# Patient Record
Sex: FEMALE | Race: OTHER | HISPANIC OR LATINO
[De-identification: names, ages, dates, MRNs, and addresses within clinical notes are randomized per-mention and may not be internally consistent; named-entity substitution may affect disease eponyms.]

---

## 2017-08-23 PROBLEM — Z00.00 ENCOUNTER FOR PREVENTIVE HEALTH EXAMINATION: Status: ACTIVE | Noted: 2017-08-23

## 2017-09-08 ENCOUNTER — APPOINTMENT (OUTPATIENT)
Dept: PULMONOLOGY | Facility: CLINIC | Age: 57
End: 2017-09-08
Payer: MEDICAID

## 2017-09-08 VITALS
HEIGHT: 62 IN | DIASTOLIC BLOOD PRESSURE: 70 MMHG | TEMPERATURE: 97.7 F | BODY MASS INDEX: 28.52 KG/M2 | OXYGEN SATURATION: 95 % | WEIGHT: 155 LBS | HEART RATE: 80 BPM | SYSTOLIC BLOOD PRESSURE: 106 MMHG

## 2017-09-08 DIAGNOSIS — Z87.891 PERSONAL HISTORY OF NICOTINE DEPENDENCE: ICD-10-CM

## 2017-09-08 PROCEDURE — 99202 OFFICE O/P NEW SF 15 MIN: CPT | Mod: 25

## 2017-09-08 PROCEDURE — 94010 BREATHING CAPACITY TEST: CPT

## 2017-09-08 PROCEDURE — 71020: CPT

## 2017-10-10 ENCOUNTER — APPOINTMENT (OUTPATIENT)
Dept: PULMONOLOGY | Facility: CLINIC | Age: 57
End: 2017-10-10

## 2017-11-06 ENCOUNTER — FORM ENCOUNTER (OUTPATIENT)
Age: 57
End: 2017-11-06

## 2017-11-07 ENCOUNTER — OUTPATIENT (OUTPATIENT)
Dept: OUTPATIENT SERVICES | Facility: HOSPITAL | Age: 57
LOS: 1 days | End: 2017-11-07
Payer: MEDICAID

## 2017-11-07 ENCOUNTER — APPOINTMENT (OUTPATIENT)
Dept: PULMONOLOGY | Facility: CLINIC | Age: 57
End: 2017-11-07
Payer: MEDICAID

## 2017-11-07 VITALS
WEIGHT: 155 LBS | HEIGHT: 62 IN | BODY MASS INDEX: 28.52 KG/M2 | SYSTOLIC BLOOD PRESSURE: 110 MMHG | OXYGEN SATURATION: 96 % | HEART RATE: 82 BPM | DIASTOLIC BLOOD PRESSURE: 68 MMHG

## 2017-11-07 DIAGNOSIS — Z87.891 PERSONAL HISTORY OF NICOTINE DEPENDENCE: ICD-10-CM

## 2017-11-07 DIAGNOSIS — F17.210 NICOTINE DEPENDENCE, CIGARETTES, UNCOMPLICATED: ICD-10-CM

## 2017-11-07 PROCEDURE — G0297: CPT | Mod: 26

## 2017-11-07 PROCEDURE — G0296 VISIT TO DETERM LDCT ELIG: CPT

## 2017-11-07 PROCEDURE — 99406 BEHAV CHNG SMOKING 3-10 MIN: CPT

## 2017-11-07 PROCEDURE — G0297: CPT

## 2017-11-22 ENCOUNTER — APPOINTMENT (OUTPATIENT)
Dept: PULMONOLOGY | Facility: CLINIC | Age: 57
End: 2017-11-22
Payer: COMMERCIAL

## 2017-11-22 PROCEDURE — 99213 OFFICE O/P EST LOW 20 MIN: CPT

## 2017-11-22 RX ORDER — VARENICLINE TARTRATE 1 MG/1
1 TABLET, FILM COATED ORAL TWICE DAILY
Qty: 1 | Refills: 5 | Status: ACTIVE | COMMUNITY
Start: 2017-11-22 | End: 1900-01-01

## 2018-01-03 ENCOUNTER — RX RENEWAL (OUTPATIENT)
Age: 58
End: 2018-01-03

## 2020-05-30 ENCOUNTER — EMERGENCY (EMERGENCY)
Facility: HOSPITAL | Age: 60
LOS: 1 days | Discharge: HOME | End: 2020-05-30
Attending: EMERGENCY MEDICINE | Admitting: EMERGENCY MEDICINE
Payer: COMMERCIAL

## 2020-05-30 VITALS
WEIGHT: 139.99 LBS | HEART RATE: 110 BPM | OXYGEN SATURATION: 94 % | TEMPERATURE: 98 F | DIASTOLIC BLOOD PRESSURE: 78 MMHG | HEIGHT: 62 IN | RESPIRATION RATE: 18 BRPM | SYSTOLIC BLOOD PRESSURE: 114 MMHG

## 2020-05-30 VITALS
RESPIRATION RATE: 18 BRPM | SYSTOLIC BLOOD PRESSURE: 111 MMHG | TEMPERATURE: 98 F | HEART RATE: 87 BPM | OXYGEN SATURATION: 94 % | DIASTOLIC BLOOD PRESSURE: 67 MMHG

## 2020-05-30 LAB
ALBUMIN SERPL ELPH-MCNC: 3.9 G/DL — SIGNIFICANT CHANGE UP (ref 3.3–5)
ALP SERPL-CCNC: 215 U/L — HIGH (ref 40–120)
ALT FLD-CCNC: 997 U/L — HIGH (ref 10–45)
ANION GAP SERPL CALC-SCNC: 14 MMOL/L — SIGNIFICANT CHANGE UP (ref 5–17)
APPEARANCE UR: CLEAR — SIGNIFICANT CHANGE UP
APTT BLD: 29.1 SEC — SIGNIFICANT CHANGE UP (ref 27.5–36.3)
AST SERPL-CCNC: 501 U/L — HIGH (ref 10–40)
BASOPHILS # BLD AUTO: 0.1 K/UL — SIGNIFICANT CHANGE UP (ref 0–0.2)
BASOPHILS NFR BLD AUTO: 1.5 % — SIGNIFICANT CHANGE UP (ref 0–2)
BILIRUB SERPL-MCNC: 1.6 MG/DL — HIGH (ref 0.2–1.2)
BILIRUB UR-MCNC: ABNORMAL
BUN SERPL-MCNC: 11 MG/DL — SIGNIFICANT CHANGE UP (ref 7–23)
CALCIUM SERPL-MCNC: 9.6 MG/DL — SIGNIFICANT CHANGE UP (ref 8.4–10.5)
CHLORIDE SERPL-SCNC: 104 MMOL/L — SIGNIFICANT CHANGE UP (ref 96–108)
CO2 SERPL-SCNC: 23 MMOL/L — SIGNIFICANT CHANGE UP (ref 22–31)
COLOR SPEC: YELLOW — SIGNIFICANT CHANGE UP
CREAT SERPL-MCNC: 0.67 MG/DL — SIGNIFICANT CHANGE UP (ref 0.5–1.3)
DIFF PNL FLD: NEGATIVE — SIGNIFICANT CHANGE UP
EOSINOPHIL # BLD AUTO: 0.3 K/UL — SIGNIFICANT CHANGE UP (ref 0–0.5)
EOSINOPHIL NFR BLD AUTO: 4.4 % — SIGNIFICANT CHANGE UP (ref 0–6)
GLUCOSE SERPL-MCNC: 97 MG/DL — SIGNIFICANT CHANGE UP (ref 70–99)
GLUCOSE UR QL: NEGATIVE — SIGNIFICANT CHANGE UP
HAV IGM SER-ACNC: SIGNIFICANT CHANGE UP
HBV CORE IGM SER-ACNC: SIGNIFICANT CHANGE UP
HBV SURFACE AG SER-ACNC: SIGNIFICANT CHANGE UP
HCT VFR BLD CALC: 36.3 % — SIGNIFICANT CHANGE UP (ref 34.5–45)
HCV AB S/CO SERPL IA: 0.1 S/CO — SIGNIFICANT CHANGE UP
HCV AB SERPL-IMP: SIGNIFICANT CHANGE UP
HGB BLD-MCNC: 11.4 G/DL — LOW (ref 11.5–15.5)
IMM GRANULOCYTES NFR BLD AUTO: 0.1 % — SIGNIFICANT CHANGE UP (ref 0–1.5)
INR BLD: 1.05 — SIGNIFICANT CHANGE UP (ref 0.88–1.16)
KETONES UR-MCNC: ABNORMAL MG/DL
LEUKOCYTE ESTERASE UR-ACNC: NEGATIVE — SIGNIFICANT CHANGE UP
LYMPHOCYTES # BLD AUTO: 1.76 K/UL — SIGNIFICANT CHANGE UP (ref 1–3.3)
LYMPHOCYTES # BLD AUTO: 25.5 % — SIGNIFICANT CHANGE UP (ref 13–44)
MCHC RBC-ENTMCNC: 26.5 PG — LOW (ref 27–34)
MCHC RBC-ENTMCNC: 31.4 GM/DL — LOW (ref 32–36)
MCV RBC AUTO: 84.4 FL — SIGNIFICANT CHANGE UP (ref 80–100)
MONOCYTES # BLD AUTO: 0.6 K/UL — SIGNIFICANT CHANGE UP (ref 0–0.9)
MONOCYTES NFR BLD AUTO: 8.7 % — SIGNIFICANT CHANGE UP (ref 2–14)
NEUTROPHILS # BLD AUTO: 4.12 K/UL — SIGNIFICANT CHANGE UP (ref 1.8–7.4)
NEUTROPHILS NFR BLD AUTO: 59.8 % — SIGNIFICANT CHANGE UP (ref 43–77)
NITRITE UR-MCNC: NEGATIVE — SIGNIFICANT CHANGE UP
NRBC # BLD: 0 /100 WBCS — SIGNIFICANT CHANGE UP (ref 0–0)
PH UR: 5.5 — SIGNIFICANT CHANGE UP (ref 5–8)
PLATELET # BLD AUTO: 263 K/UL — SIGNIFICANT CHANGE UP (ref 150–400)
POTASSIUM SERPL-MCNC: 4.1 MMOL/L — SIGNIFICANT CHANGE UP (ref 3.5–5.3)
POTASSIUM SERPL-SCNC: 4.1 MMOL/L — SIGNIFICANT CHANGE UP (ref 3.5–5.3)
PROT SERPL-MCNC: 7.1 G/DL — SIGNIFICANT CHANGE UP (ref 6–8.3)
PROT UR-MCNC: ABNORMAL MG/DL
PROTHROM AB SERPL-ACNC: 12 SEC — SIGNIFICANT CHANGE UP (ref 10–12.9)
RBC # BLD: 4.3 M/UL — SIGNIFICANT CHANGE UP (ref 3.8–5.2)
RBC # FLD: 15.4 % — HIGH (ref 10.3–14.5)
SARS-COV-2 RNA SPEC QL NAA+PROBE: SIGNIFICANT CHANGE UP
SODIUM SERPL-SCNC: 141 MMOL/L — SIGNIFICANT CHANGE UP (ref 135–145)
SP GR SPEC: >=1.03 — SIGNIFICANT CHANGE UP (ref 1–1.03)
UROBILINOGEN FLD QL: 1 E.U./DL — SIGNIFICANT CHANGE UP
WBC # BLD: 6.89 K/UL — SIGNIFICANT CHANGE UP (ref 3.8–10.5)
WBC # FLD AUTO: 6.89 K/UL — SIGNIFICANT CHANGE UP (ref 3.8–10.5)

## 2020-05-30 PROCEDURE — 87635 SARS-COV-2 COVID-19 AMP PRB: CPT

## 2020-05-30 PROCEDURE — 76705 ECHO EXAM OF ABDOMEN: CPT | Mod: 26

## 2020-05-30 PROCEDURE — 99284 EMERGENCY DEPT VISIT MOD MDM: CPT

## 2020-05-30 PROCEDURE — 80053 COMPREHEN METABOLIC PANEL: CPT

## 2020-05-30 PROCEDURE — 85610 PROTHROMBIN TIME: CPT

## 2020-05-30 PROCEDURE — 81001 URINALYSIS AUTO W/SCOPE: CPT

## 2020-05-30 PROCEDURE — 74177 CT ABD & PELVIS W/CONTRAST: CPT

## 2020-05-30 PROCEDURE — 83690 ASSAY OF LIPASE: CPT

## 2020-05-30 PROCEDURE — 87086 URINE CULTURE/COLONY COUNT: CPT

## 2020-05-30 PROCEDURE — 99285 EMERGENCY DEPT VISIT HI MDM: CPT

## 2020-05-30 PROCEDURE — 36415 COLL VENOUS BLD VENIPUNCTURE: CPT

## 2020-05-30 PROCEDURE — 80074 ACUTE HEPATITIS PANEL: CPT

## 2020-05-30 PROCEDURE — 85730 THROMBOPLASTIN TIME PARTIAL: CPT

## 2020-05-30 PROCEDURE — 82962 GLUCOSE BLOOD TEST: CPT

## 2020-05-30 PROCEDURE — 85025 COMPLETE CBC W/AUTO DIFF WBC: CPT

## 2020-05-30 PROCEDURE — 74177 CT ABD & PELVIS W/CONTRAST: CPT | Mod: 26

## 2020-05-30 PROCEDURE — 76705 ECHO EXAM OF ABDOMEN: CPT

## 2020-05-30 RX ORDER — SODIUM CHLORIDE 9 MG/ML
1000 INJECTION INTRAMUSCULAR; INTRAVENOUS; SUBCUTANEOUS ONCE
Refills: 0 | Status: COMPLETED | OUTPATIENT
Start: 2020-05-30 | End: 2020-05-30

## 2020-05-30 RX ORDER — IOHEXOL 300 MG/ML
30 INJECTION, SOLUTION INTRAVENOUS ONCE
Refills: 0 | Status: COMPLETED | OUTPATIENT
Start: 2020-05-30 | End: 2020-05-30

## 2020-05-30 RX ADMIN — Medication 50 MILLIGRAM(S): at 14:02

## 2020-05-30 RX ADMIN — SODIUM CHLORIDE 1000 MILLILITER(S): 9 INJECTION INTRAMUSCULAR; INTRAVENOUS; SUBCUTANEOUS at 15:00

## 2020-05-30 RX ADMIN — IOHEXOL 30 MILLILITER(S): 300 INJECTION, SOLUTION INTRAVENOUS at 15:00

## 2020-05-30 NOTE — ED ADULT TRIAGE NOTE - CHIEF COMPLAINT QUOTE
"I have UTI. I was on nitrofurantoin, but then I had allergic reaction on the 4th day. Then, I took amoxicillin for 7 days. My urine is still dark. I'm also still itchy."

## 2020-05-30 NOTE — ED PROVIDER NOTE - OBJECTIVE STATEMENT
58 y/o M with PMHx of reflux on omeprazole with dark urine x 10 days and urinary incontinence for the past few months, has had to wear an adult diaper due to incontinence but urine became dark recently which patient is concerned about. Went to  and had a UTI showing on UA, given macrobid, taken for 4 days after which she developed itchyness and hives, scratching but no throat swelling. Returned to  and taken off Macrobid, given Amoxicillin instead as well as Benadryl. patient still feels itchy, especially at night, and says benadryl makes her feel tried and her urine is still dark after taking the Macrobid. Saw outside urologist who took another sample but was focused on addressing the incontinence, recommending possible surgery, patient however dissatisfied because she is concerned about the color of her urine which he did not mention. Denies abdominal pain, back pain, fever, chills, nausea, vomiting, diarrhea, SOB, chest pain.

## 2020-05-30 NOTE — ED PROVIDER NOTE - PATIENT PORTAL LINK FT
You can access the FollowMyHealth Patient Portal offered by [facility] by registering at the following website: [email or web address]. By joining [facility]’s FollowMyHealth portal, you will also be able to view your health information using other applications (apps) compatible with our system.

## 2020-05-30 NOTE — ED ADULT NURSE NOTE - OBJECTIVE STATEMENT
Pt is a 58 y/o female came in to ED for evaluation of dark urine and itching at night that started when she was taking nitrofurantoin  for UTI. PT reports finishing a course of Augmenting, however continues to feel itching at night. Pt denies urinary discomfort, denies back pain, denies any other symptoms.

## 2020-05-30 NOTE — ED PROVIDER NOTE - NSFOLLOWUPINSTRUCTIONS_ED_ALL_ED_FT
Your liver enzymes were elevated causing bilirubin in urine (dark colored) which is also causing your itching.  Follow up with your GI doctor on Monday as scheduled and show him labs and imaging.  Have your labs repeated in 1-2 weeks to ensure normalization of enzymes.    What is transaminitis?  Your liver breaks down nutrients and filters toxins out of your body, which it does with the help of enzymes. Transaminitis, sometimes called hypertransaminasemia, refers to having high levels of certain liver enzymes called transaminases. When you have too many enzymes in your liver, they start to move into your blood stream. Alanine transaminase (ALT) and aspartate transaminase (AST) are the two most common transaminases involved in transaminitis.    Most people with transaminitis don’t know they have it until they do a liver function test. Transaminitis itself doesn’t produce any symptoms, but it usually indicates that there’s something else going on, so doctors use it as a diagnostic tool. Some people also have temporarily high levels of liver enzymes without any underlying cause. However, because transaminitis can by a symptom of serious conditions, such as liver disease or hepatitis, it’s important to rule out any potential causes.    Common causes of transaminitis  Fatty liver disease  Your liver naturally contains some fat, but too much of it can lead to fatty liver disease. It’s usually associated with drinking large amounts of alcohol, but nonalcoholic fatty liver disease is becoming more common. No one’s sure exactly what causes nonalcoholic fatty liver disease, but common risk factors include:    obesity  high cholesterol  Fatty liver disease usually doesn’t cause any symptoms, and most people don’t know they have it until they get a blood test. However, some people have fatigue, mild abdominal pain, or an enlarged liver that your doctor can feel during a physical exam. Treating fatty liver disease often involves lifestyle changes, such as avoiding alcohol, maintaining a healthy weight, and eating a balanced diet.    Viral hepatitis  Hepatitis refers to inflammation of the liver. There are several types of hepatitis, but the most common one is viral hepatitis. The most common types of viral hepatitis that cause transaminitis are hepatitis B and hepatitis C.    Hepatitis B and C share the same symptoms, which include:    yellow-tinted skin and eyes, called jaundice  dark urine  nausea and vomiting  fatigue  abdominal pain or discomfort  joint and muscle pain  fever  loss of appetite  Talk to your doctor if you have any symptoms of viral hepatitis. If left untreated, it can cause permanent liver damage, especially if you have hepatitis C.    Medications, supplements, and herbs  In addition to helping your body process food, your liver also breaks down anything else you take by mouth, including medications, supplements, and herbs. Sometimes these can cause transaminitis, especially when they’re taken in high doses.    Medications that can cause transaminitis include:    over-the-counter pain medications, such as acetaminophen (Tylenol) or ibuprofen (Advil, Motrin)  statins, such as atorvastatin (Lipitor) and lovastatin (Mevacor, Altocor)  cardiovascular medications, such as amiodarone (Cordarone) and hydralazine (Apresoline)  cyclic antidepressants, such as desipramine (Norpramin) and Imipramine (Tofranil)  Supplements that may cause transaminitis include:    vitamin A  Common herbs that may cause transaminitis include:    chaparral  kava  senna  skullcap  ephedra  If you take any of these, tell your doctor about any unusual symptoms you have. You may also want to have your blood tested regularly to make sure they’re not affecting your liver. If they are, you likely just need to lower the amount you take.      Less common causes of transaminitis  HELLP syndrome  HELLP syndrome is a serious condition that affects 5–8 percent of pregnancies. It refers to a group of symptoms that include:    Hemolysis  EL: elevated liver enzymes  LP: low platelet count  It’s often associated with preeclampsia, which causes high blood pressure in pregnant women. HELLP syndrome can cause liver damage, bleeding problems, and even death if it’s not properly managed.    Additional symptoms of HELLP syndrome include:    fatigue  stomach pain  nausea and vomiting  abdominal pain  shoulder pain  pain when breathing deeply  bleeding  swelling  changes in vision  If you’re pregnant and start to notice any of these symptoms, contact your doctor as soon as possible.    Genetic diseases  Several inherited diseases can cause transaminitis. They’re usually conditions that affect your body’s metabolic processes.    Genetic diseases that can cause transaminitis include:    hemochromatosis  celiac disease  Geoff’s disease  alpha-antitrypsin deficiency  Nonviral hepatitis  Autoimmune hepatitis and alcoholic hepatitis are two common types of nonviral hepatitis that can cause transaminitis. Nonviral hepatitis produces the same symptoms as viral hepatitis.    Autoimmune hepatitis happens when your immune system attacks cells in your liver. Researchers aren’t sure what causes this, but genetic and environmental factors seem to play a role.    Alcoholic hepatitis results from drinking a lot of alcohol, usually over the course of many years. If you have alcoholic hepatitis, you must stop drinking alcohol. Not doing so can lead to serious complications, including death.    Viral infections  The most common viral infections that cause transaminitis are infectious mononucleosis and cytomegalovirus (CMV) infection.    Infectious mononucleosis is spread through saliva and may cause:    swollen tonsils and lymph nodes  sore throat  fever  swollen spleen  headaches  fever  CMV infection is very common and can be spread through several body fluids, including saliva, blood, urine, semen, and breast milk. Most people don’t experience any symptoms unless they have a weakened immune system. When CMV infection does cause symptoms, they’re usually similar to those of infectious mononucleosis.    The bottom line  A variety of things, from serious diseases to simple medication changes, can cause elevated liver enzymes, known as transaminitis. It’s also not unusual for some people to temporarily have increased liver enzymes. If a blood test shows that you have transaminitis, it’s important to work with your doctor to rule out any possible underlying causes because many of them can lead to serious liver damage and even liver failure if left untreated.

## 2020-05-30 NOTE — ED ADULT TRIAGE NOTE - OTHER COMPLAINTS
Pt denies burning/frequent urination, abdominal pain, vomiting, fever. Pt denies burning/frequent urination, abdominal pain, vomiting, fever, palpitation, SOB, chest pain, cough.

## 2020-05-30 NOTE — ED PROVIDER NOTE - CLINICAL SUMMARY MEDICAL DECISION MAKING FREE TEXT BOX
60 y/o F with urinary incontinence for the past few months alongside darkening urine 10 days ago, taken entire course of amoxicillin and partial course of macrobid (stopped due to allergic rx) with no improvement, also notes still itchy although 5 days off macrobid. Noted random areas of urticaria on back and chest. Plan for labs including UA and give 50mg prednisone to relieve rashes. 60 y/o F with urinary incontinence for the past few months alongside darkening urine 10 days ago, taken entire course of amoxicillin and partial course of macrobid (stopped due to allergic rx) with no improvement, also notes still itchy although 5 days off macrobid. Noted random areas of urticaria on back and chest. Plan for labs including UA and give 50mg prednisone to relieve rash.  Pt with bilirubin in urine and elevated LFTs.  hep panel neg, ct abd/pelvis - no findings, will send for US.  ? related to macrobid - mild hepatitis.

## 2020-06-01 LAB
CULTURE RESULTS: SIGNIFICANT CHANGE UP
SPECIMEN SOURCE: SIGNIFICANT CHANGE UP

## 2020-06-02 DIAGNOSIS — R94.5 ABNORMAL RESULTS OF LIVER FUNCTION STUDIES: ICD-10-CM

## 2020-06-02 DIAGNOSIS — R32 UNSPECIFIED URINARY INCONTINENCE: ICD-10-CM

## 2020-06-02 DIAGNOSIS — Z20.828 CONTACT WITH AND (SUSPECTED) EXPOSURE TO OTHER VIRAL COMMUNICABLE DISEASES: ICD-10-CM

## 2020-06-02 DIAGNOSIS — R82.998 OTHER ABNORMAL FINDINGS IN URINE: ICD-10-CM

## 2020-09-18 PROBLEM — K21.9 GASTRO-ESOPHAGEAL REFLUX DISEASE WITHOUT ESOPHAGITIS: Chronic | Status: ACTIVE | Noted: 2020-05-30

## 2020-09-29 ENCOUNTER — APPOINTMENT (OUTPATIENT)
Dept: PULMONOLOGY | Facility: CLINIC | Age: 60
End: 2020-09-29
Payer: MEDICAID

## 2020-09-29 VITALS
WEIGHT: 140 LBS | OXYGEN SATURATION: 95 % | HEIGHT: 62 IN | HEART RATE: 79 BPM | BODY MASS INDEX: 25.76 KG/M2 | TEMPERATURE: 98.1 F | DIASTOLIC BLOOD PRESSURE: 68 MMHG | SYSTOLIC BLOOD PRESSURE: 120 MMHG

## 2020-09-29 PROCEDURE — 99214 OFFICE O/P EST MOD 30 MIN: CPT | Mod: 25

## 2020-09-29 PROCEDURE — 94010 BREATHING CAPACITY TEST: CPT

## 2020-09-29 RX ORDER — VARENICLINE TARTRATE 0.5 (11)-1
0.5 MG X 11 & KIT ORAL
Qty: 1 | Refills: 0 | Status: ACTIVE | COMMUNITY
Start: 2017-11-22 | End: 1900-01-01

## 2020-09-30 NOTE — HISTORY OF PRESENT ILLNESS
[TextBox_4] : Current smoker here for follow up for CT chest done for lung CA screening. Her initial scan was in 2017 and was supposed to get a follow up 1 year after but did not for unclear reasons. \par She has a chronic dry cough that is unchanged, but otherwise has no other respiratory symptoms and feels well overall. No other constitutional symptoms including unintended weight loss, night sweats, fevers.

## 2020-09-30 NOTE — DISCUSSION/SUMMARY
[FreeTextEntry1] : Scan reviewed ([facility]) - No significant change since 2017. \par \par Counselled about smoking cessation and referred to our smoking cessation program. \par \par f/u CT in 1 year.

## 2020-10-02 ENCOUNTER — APPOINTMENT (OUTPATIENT)
Dept: PULMONOLOGY | Facility: CLINIC | Age: 60
End: 2020-10-02
Payer: MEDICAID

## 2020-10-02 DIAGNOSIS — F17.200 NICOTINE DEPENDENCE, UNSPECIFIED, UNCOMPLICATED: ICD-10-CM

## 2020-10-02 PROCEDURE — 99443: CPT

## 2020-10-02 RX ORDER — NICOTINE POLACRILEX 4 MG/1
4 LOZENGE ORAL
Qty: 1 | Refills: 1 | Status: ACTIVE | COMMUNITY
Start: 2020-10-02 | End: 1900-01-01

## 2020-10-02 NOTE — HISTORY OF PRESENT ILLNESS
[Medical Office: ([facility])___] : at the medical office located in  [TextBox_4] : 60 year old current smoker interested in smoking cessation referred by Dr. Cruz. \par \par \par (1) Choose the statement that best describes your situation \par a.             I am not thinking about quitting in the next 6 months.\par b.             I am ambivalent, but thinking about quitting within the next 6 months.\par c.             I am getting ready to quit within the next 30 days and have a quit date.\par d.             I have quit smoking within the past 6 months and am actively applying cessation skills.\par e.             I have quit for more than 6 months & am integrating smoke-free living into my daily routine.\par  \par \par C. \par  \par  \par (2)) Tobacco Use:\par  \par What do you smoke? Cigarettes \par How old were you when you first started smoking? 32 years old\par How long have you been smoking? 28 years\par How many packs per day, on average? 1/2 pack a day \par   \par  \par (3) Heaviness of Smoking Index\par A, How many cigarettes, on average, do you smoke per day? \par 1-10 (0 pts) \par 11-20 (1 pt)\par 21-30 (2 pts)\par 31+ (3 pts)\par B. How soon after waking up do you smoke your first cigarette?\par </=5 mins (3 pts)\par 6-30 mins (2 pts)\par 31-60 mins (1 pt)\par 60+ mins (0 pts)\par Total Score: \par Low (0-1)\par Medium (2-4)\par High (5-6) \par \par A- 0\par B-3\par Score: 3; Medium\par                                  \par (4) Have you ever tried to quit smoking before? No\par - If so, what was the longest period of time that you were able to quit?  \par - How were you successful in quitting?\par  \par (5) Using a scale of 1-10, with 1 being not at all and 10 being extremely confident\par - How confident are you that you will succeed in stopping your tobacco use now? \par 9\par  \par (6) What triggers your tobacco use now? (For example;  major life crisis, stress, weight gain, being at work, feeling anxious, coffee, talking on the phone, alcohol, after meals when driving, around other smokers, alone/bored, in restaurants, in pain, etc.) \par "I enjoy it", "It's my emotional availability" \par                                                                                                                                                                                 \par (7)  What are your obstacles to quitting? (For example; cost of medications, weight gain, social situations, stress, unbearable cravings, fear of failure, spouse smokes, etc.) What are some ways we can overcome these obstacles together?\par Fear of failing\par Can overcome this by taking "baby steps"                                                                                                                                                                                                                                                           \par (8) Why do you want to quit now? (For example, pressure from doctor, cost of cigarettes, fewer places were smoking is allowed, concern of secondhand smoke exposure, pressure from family, change in health status, etc.) \par Live in a non-smoking building, fear of getting kicked out- feels like she can enjoy her apartment and not live in fear of getting kicked out \par CT screening for lung cancer was clear, but has this smoker's cough that really bothers her, especially now during the COVID \par                                                                                                                                                                                                                                                                                                                                                                    \par (9) Quit Date: 10/30/20\par \par  \par (10) Means of quitting:\par  \par Acknowledged how difficult it is to quit smoking. Advised that smoking is the most important thing a person can do for their health. Discussed strategies to deal with cravings and suggested keeping a log of cigarettes smoked and their triggers in an effort to identify triggers and break routines/habits. Suggested writing down each time of cigarette and attempting to delay time to next cigarette. We had a thorough discussion of the side effects of nicotine replacement therapy, including anxiety and insomnia. We have had an in-depth discussion regarding specific coping mechanisms, such as deep breathing, keeping hands busy (e.g. doodling, stress ball, knitting), changing activities associated with smoking (such as walking/reading a book instead of taking a cigarette break), not carrying cigarettes or lighters around, visiting places more often which don't allow smoking (such as museums & libraries), eating low-calorie and healthy foods when experiencing urges/cravings (such as carrot/celery sticks, fresh fruit), drinking a lot of fluid, cutting down on caffeine & alcohol (can trigger smoking urge), exercising, & spending time with non-smokers.\par \par \par We have had an in-depth discussion on Chantix, including the various side effects such as nausea, headache, insomnia, dry mouth & vivid dreams. I advised patient to take medication with a full glass of water and food to avoid nausea. Patient advised to contact office immediately should he/she experience any changes in mood or behavior. Patient to avoid driving or doing other similar tasks requiring alertness until patient determines how drug affects him/her, as Chantix may cause CNS depression. Confirmed with patient no hx of CKD or ESRD. \par \par We also discussed NRT, specifically patch and lozenge including dosing, side effects & proper usage. \par \par Does not want referral to NRT at this time. Given "smokefree.gov" as a resource. \par  [Home] : at home, [unfilled] , at the time of the visit. [Other Location: e.g. Home (Enter Location, City,State)___] : at [unfilled] [Verbal consent obtained from patient] : the patient, [unfilled]

## 2020-10-19 ENCOUNTER — NON-APPOINTMENT (OUTPATIENT)
Age: 60
End: 2020-10-19

## 2020-12-07 ENCOUNTER — NON-APPOINTMENT (OUTPATIENT)
Age: 60
End: 2020-12-07

## 2021-10-25 ENCOUNTER — NON-APPOINTMENT (OUTPATIENT)
Age: 61
End: 2021-10-25

## 2021-11-01 ENCOUNTER — APPOINTMENT (OUTPATIENT)
Dept: PULMONOLOGY | Facility: CLINIC | Age: 61
End: 2021-11-01
Payer: MEDICAID

## 2021-11-01 VITALS
SYSTOLIC BLOOD PRESSURE: 130 MMHG | OXYGEN SATURATION: 96 % | HEART RATE: 82 BPM | WEIGHT: 166 LBS | HEIGHT: 62 IN | TEMPERATURE: 97.6 F | DIASTOLIC BLOOD PRESSURE: 78 MMHG | BODY MASS INDEX: 30.55 KG/M2

## 2021-11-01 PROCEDURE — 99214 OFFICE O/P EST MOD 30 MIN: CPT

## 2021-11-02 NOTE — DISCUSSION/SUMMARY
[FreeTextEntry1] : Current smoker (on chantix and nicotine patch- stopped today).\par Enrolled in lung cancer screening trial and was told by PCP that she has left lung collapse. We reviewed CT imaging with her and found to have linear atelectasis in left lower lobe which has been stable for many years now. Resume yearly CT scans. She denies any dyspnea.\par \par Counselled about smoking cessation \par \par f/u CT in 1 year.

## 2021-11-02 NOTE — HISTORY OF PRESENT ILLNESS
[TextBox_4] : Current smoker here for follow up for CT chest done for lung CA screening. Her initial scan was in 2017 and was supposed to get a follow up 1 year after but did not for unclear reasons. \par She has a chronic dry cough that is unchanged, but otherwise has no other respiratory symptoms and feels well overall. No other constitutional symptoms including unintended weight loss, night sweats, fevers. \par \par she had been coughing, dry cough usually in morning x 6 months. Cough has now improved. She had a routine lung cancer screening CT chest in 10/25/21 showing left lung collapse ? and she is worried about this. She is smoking 40 years (20 pack years).  Currently using chantix and nicotine patch x 3 days and hasn't smoked today.

## 2022-03-25 ENCOUNTER — APPOINTMENT (OUTPATIENT)
Dept: NEUROLOGY | Facility: CLINIC | Age: 62
End: 2022-03-25
Payer: MEDICAID

## 2022-03-25 VITALS
OXYGEN SATURATION: 96 % | WEIGHT: 163 LBS | BODY MASS INDEX: 30 KG/M2 | DIASTOLIC BLOOD PRESSURE: 74 MMHG | HEART RATE: 92 BPM | SYSTOLIC BLOOD PRESSURE: 107 MMHG | HEIGHT: 62 IN | TEMPERATURE: 97.8 F

## 2022-03-25 DIAGNOSIS — Z87.898 PERSONAL HISTORY OF OTHER SPECIFIED CONDITIONS: ICD-10-CM

## 2022-03-25 DIAGNOSIS — F32.A DEPRESSION, UNSPECIFIED: ICD-10-CM

## 2022-03-25 DIAGNOSIS — Z82.0 FAMILY HISTORY OF EPILEPSY AND OTHER DISEASES OF THE NERVOUS SYSTEM: ICD-10-CM

## 2022-03-25 DIAGNOSIS — R47.89 OTHER SPEECH DISTURBANCES: ICD-10-CM

## 2022-03-25 DIAGNOSIS — R51.9 HEADACHE, UNSPECIFIED: ICD-10-CM

## 2022-03-25 PROCEDURE — 99203 OFFICE O/P NEW LOW 30 MIN: CPT

## 2022-03-25 NOTE — PHYSICAL EXAM
[FreeTextEntry1] : Physical Exam\par Constitutional: no apparent distress\par Psychiatric: normal affect, euthymic, alert and oriented x 3\par \par Neurologic Exam:\par Mental Status: awake and alert, speech fluent and prosodic with no paraphasic errors, MOCA 21/30\par Cranial Nerves: I: deferred; II: pupils equal round and reactive, visual fields full to confrontation, III, IV, VI: extraocular movements full with no nystagmus; V: facial sensation intact and symmetric; VII: facial power symmetric; VIII: hearing intact to finger rub; IX/X: palate elevates symmetrically, no dysarthria; XI: shoulder shrug symmetric; XII: tongue protrudes midline\par Motor: normal bulk and tone, no orbiting or pronator drift, power 5/5 to confrontation throughout including shoulder abduction, elbow flexion and extension, wrist flexion and extension, hip flexion, knee flexion and extension, no abnormal movements\par Sensation: intact to light touch in distal upper and lower extremities bilaterally\par Coordination: finger-nose-finger intact bilaterally\par Reflexes: 2+ biceps, triceps, brachioradialis, patella\par Gait: narrow base, normal stance and stride, normal arm swing, normal tandem, negative Romberg\par

## 2022-03-25 NOTE — HISTORY OF PRESENT ILLNESS
[FreeTextEntry1] : About two months ago had a week-long episode during which she had trouble remembering names (celebrities and people she knows).  Has also been having intermittent headaches since entering perimenopause ~9 months ago, bifrontal, no change in vision, no photophobia/phonophobia.  No headaches upon awakening.  Of note also has depression, sees a therapist, not on medications.

## 2022-03-25 NOTE — ASSESSMENT
[FreeTextEntry1] : 1) Tension headaches -- no red flags, okay to take Tylenol/NSAIDs PRN\par \par 2) MOCA 21/30 -- possibly related to depression and/or perimenopause; may also represent baseline deficit as she has dyslexia and always struggled in school.  No additional work up for now, but advised her to let me know if symptoms worsen.\par \par

## 2022-05-23 ENCOUNTER — APPOINTMENT (OUTPATIENT)
Dept: PULMONOLOGY | Facility: CLINIC | Age: 62
End: 2022-05-23
Payer: MEDICAID

## 2022-05-23 VITALS
HEIGHT: 62 IN | TEMPERATURE: 98.1 F | DIASTOLIC BLOOD PRESSURE: 75 MMHG | SYSTOLIC BLOOD PRESSURE: 109 MMHG | OXYGEN SATURATION: 94 % | HEART RATE: 94 BPM | BODY MASS INDEX: 30 KG/M2 | WEIGHT: 163 LBS

## 2022-05-23 PROCEDURE — 94010 BREATHING CAPACITY TEST: CPT

## 2022-05-23 PROCEDURE — 99213 OFFICE O/P EST LOW 20 MIN: CPT | Mod: 25

## 2022-05-24 NOTE — DISCUSSION/SUMMARY
[FreeTextEntry1] : Current smoker (20 pack yrs), very occasional cig per patient. \par Enrolled in lung cancer screening trial, repeat CT chest due in Oct 2022. \par  We reviewed prior CT imaging with her and found to have linear atelectasis in left lower lobe which has been stable for many years now. cont yearly CT scans. She denies any dyspnea.\par \par Counselled about smoking cessation \par

## 2022-05-24 NOTE — HISTORY OF PRESENT ILLNESS
[TextBox_4] : The patient is a Current smoker (very occasional cig per patient). \par She reports to be doing well, no complaints at this point. She is enrolled in lung cancer screening program and comes to clinic today for routine visit. \par Her initial scan was in 2017 and was supposed to get a follow up 1 year after but did not for unclear reasons. \par She has a chronic dry cough that is unchanged, but otherwise has no other respiratory symptoms and feels well overall. No other constitutional symptoms including unintended weight loss, night sweats, fevers. \par

## 2024-12-23 ENCOUNTER — APPOINTMENT (OUTPATIENT)
Dept: PULMONOLOGY | Facility: CLINIC | Age: 64
End: 2024-12-23

## 2025-01-03 ENCOUNTER — APPOINTMENT (OUTPATIENT)
Dept: PULMONOLOGY | Facility: CLINIC | Age: 65
End: 2025-01-03
Payer: MEDICAID

## 2025-01-03 VITALS
WEIGHT: 158 LBS | OXYGEN SATURATION: 97 % | BODY MASS INDEX: 29.08 KG/M2 | TEMPERATURE: 97.2 F | SYSTOLIC BLOOD PRESSURE: 100 MMHG | DIASTOLIC BLOOD PRESSURE: 80 MMHG | HEIGHT: 62 IN | HEART RATE: 78 BPM

## 2025-01-03 PROCEDURE — 99212 OFFICE O/P EST SF 10 MIN: CPT
